# Patient Record
Sex: FEMALE | Race: BLACK OR AFRICAN AMERICAN | NOT HISPANIC OR LATINO
[De-identification: names, ages, dates, MRNs, and addresses within clinical notes are randomized per-mention and may not be internally consistent; named-entity substitution may affect disease eponyms.]

---

## 2017-03-24 ENCOUNTER — TRANSCRIPTION ENCOUNTER (OUTPATIENT)
Age: 57
End: 2017-03-24

## 2017-04-19 ENCOUNTER — TRANSCRIPTION ENCOUNTER (OUTPATIENT)
Age: 57
End: 2017-04-19

## 2017-07-13 ENCOUNTER — TRANSCRIPTION ENCOUNTER (OUTPATIENT)
Age: 57
End: 2017-07-13

## 2019-05-09 PROBLEM — Z00.00 ENCOUNTER FOR PREVENTIVE HEALTH EXAMINATION: Status: ACTIVE | Noted: 2019-05-09

## 2019-08-07 ENCOUNTER — APPOINTMENT (OUTPATIENT)
Dept: BREAST CENTER | Facility: CLINIC | Age: 59
End: 2019-08-07
Payer: COMMERCIAL

## 2019-08-07 VITALS
BODY MASS INDEX: 30.53 KG/M2 | HEART RATE: 79 BPM | DIASTOLIC BLOOD PRESSURE: 94 MMHG | HEIGHT: 66 IN | SYSTOLIC BLOOD PRESSURE: 153 MMHG | WEIGHT: 190 LBS

## 2019-08-07 DIAGNOSIS — Z78.9 OTHER SPECIFIED HEALTH STATUS: ICD-10-CM

## 2019-08-07 DIAGNOSIS — Z80.51 FAMILY HISTORY OF MALIGNANT NEOPLASM OF KIDNEY: ICD-10-CM

## 2019-08-07 DIAGNOSIS — Z83.3 FAMILY HISTORY OF DIABETES MELLITUS: ICD-10-CM

## 2019-08-07 DIAGNOSIS — Z86.79 PERSONAL HISTORY OF OTHER DISEASES OF THE CIRCULATORY SYSTEM: ICD-10-CM

## 2019-08-07 DIAGNOSIS — R92.8 OTHER ABNORMAL AND INCONCLUSIVE FINDINGS ON DIAGNOSTIC IMAGING OF BREAST: ICD-10-CM

## 2019-08-07 PROCEDURE — 99205 OFFICE O/P NEW HI 60 MIN: CPT

## 2019-08-07 RX ORDER — LOSARTAN POTASSIUM AND HYDROCHLOROTHIAZIDE 12.5; 1 MG/1; MG/1
100-12.5 TABLET ORAL DAILY
Refills: 0 | Status: ACTIVE | COMMUNITY

## 2019-08-07 NOTE — PROCEDURE
[FreeTextEntry1] : Right breast ultrasound [FreeTextEntry2] : Assess cyst [FreeTextEntry3] : The right retroareolar region was imaged and a discrete cyst was not visualized

## 2019-08-07 NOTE — PHYSICAL EXAM
[EOMI] : extra ocular movement intact [Sclera nonicteric] : sclera nonicteric [Supple] : supple [No Supraclavicular Adenopathy] : no supraclavicular adenopathy [No Cervical Adenopathy] : no cervical adenopathy [No Thyromegaly] : no thyromegaly [Clear to Auscultation Bilat] : clear to auscultation bilaterally [Normal Sinus Rhythm] : normal sinus rhythm [Normal S1, S2] : normal S1 and S2 [No dominant masses] : no dominant masses in right breast  [Examined in the supine and seated position] : examined in the supine and seated position [No dominant masses] : no dominant masses left breast [No Nipple Retraction] : no right nipple retraction [No Nipple Discharge] : no left nipple discharge [Soft] : abdomen soft [No Axillary Lymphadenopathy] : no left axillary lymphadenopathy [Not Tender] : non-tender [No Palpable Masses] : no abdominal mass palpated

## 2019-08-07 NOTE — HISTORY OF PRESENT ILLNESS
[FreeTextEntry1] : This is a 59 year old female who had some pain in her left breast and was sent for a mammogram in January.  A new nodule was seen on the right and an ultrasound was done.  The left pain went away, but Dr. Scanlon advised she come in for an evaluation.  She has no current breast symptoms.

## 2019-08-07 NOTE — DATA REVIEWED
[FreeTextEntry1] : Bilateral mammogram 1/26/2019:  1 cm Nodule in right retroareolar area.  Sonogram advised. (Nodule has some calcifications)\par \par Bilateral breast ultrasound 2/8/2019:  Right 1 cm simple cyst at area of mammographic concern.  Smaller cyst 4 mm 7:00.  Left two cysts 12:00 and 6:00 4 and 7 mm.\par \par (Images reviewed and will be sent to [facility])

## 2019-08-19 ENCOUNTER — FORM ENCOUNTER (OUTPATIENT)
Age: 59
End: 2019-08-19

## 2019-08-20 ENCOUNTER — APPOINTMENT (OUTPATIENT)
Dept: MAMMOGRAPHY | Facility: CLINIC | Age: 59
End: 2019-08-20
Payer: COMMERCIAL

## 2019-08-20 ENCOUNTER — APPOINTMENT (OUTPATIENT)
Dept: ULTRASOUND IMAGING | Facility: CLINIC | Age: 59
End: 2019-08-20
Payer: COMMERCIAL

## 2019-08-20 ENCOUNTER — OUTPATIENT (OUTPATIENT)
Dept: OUTPATIENT SERVICES | Facility: HOSPITAL | Age: 59
LOS: 1 days | End: 2019-08-20
Payer: COMMERCIAL

## 2019-08-20 DIAGNOSIS — R92.8 OTHER ABNORMAL AND INCONCLUSIVE FINDINGS ON DIAGNOSTIC IMAGING OF BREAST: ICD-10-CM

## 2019-08-20 PROCEDURE — 77065 DX MAMMO INCL CAD UNI: CPT | Mod: 26,RT

## 2019-08-20 PROCEDURE — G0279: CPT | Mod: 26

## 2019-08-20 PROCEDURE — 77065 DX MAMMO INCL CAD UNI: CPT

## 2019-08-20 PROCEDURE — 76641 ULTRASOUND BREAST COMPLETE: CPT

## 2019-08-20 PROCEDURE — 76641 ULTRASOUND BREAST COMPLETE: CPT | Mod: 26,RT

## 2019-08-20 PROCEDURE — G0279: CPT

## 2020-03-11 ENCOUNTER — APPOINTMENT (OUTPATIENT)
Dept: MAMMOGRAPHY | Facility: CLINIC | Age: 60
End: 2020-03-11

## 2020-05-11 ENCOUNTER — APPOINTMENT (OUTPATIENT)
Dept: ULTRASOUND IMAGING | Facility: CLINIC | Age: 60
End: 2020-05-11

## 2020-05-11 ENCOUNTER — APPOINTMENT (OUTPATIENT)
Dept: MAMMOGRAPHY | Facility: CLINIC | Age: 60
End: 2020-05-11

## 2021-07-01 ENCOUNTER — APPOINTMENT (OUTPATIENT)
Dept: ORTHOPEDIC SURGERY | Facility: CLINIC | Age: 61
End: 2021-07-01
Payer: COMMERCIAL

## 2021-07-01 ENCOUNTER — NON-APPOINTMENT (OUTPATIENT)
Age: 61
End: 2021-07-01

## 2021-07-01 PROCEDURE — 99204 OFFICE O/P NEW MOD 45 MIN: CPT

## 2021-07-01 PROCEDURE — 72100 X-RAY EXAM L-S SPINE 2/3 VWS: CPT

## 2021-07-01 RX ORDER — METHYLPREDNISOLONE 4 MG/1
4 TABLET ORAL
Qty: 1 | Refills: 0 | Status: ACTIVE | COMMUNITY
Start: 2021-07-01 | End: 1900-01-01

## 2021-07-01 NOTE — PHYSICAL EXAM
[de-identified] : Lumbosacral Physical Examination: \par \par General: Alert and oriented x3.  In no acute distress.  Pleasant in nature with a normal affect.  No apparent respiratory distress. \par Inspection: No swelling, No scoliosis present.\par \par ROM:\par Forward Flexion: 90 degrees\par Extension: 30 degrees\par Lateral Flexion to Left: 30 degrees\par Lateral Flexion to Right: 30 degrees\par Rotation to Left: 40 degrees\par Rotation to Right: 40 degrees\par \par Normal Hip ROM.\par \par Palpation: \par Thoracolumbar Paraspinal Muscles: Negative\par Costovertebral Angle Pain/Spine Percussion: Negative for pain over the Kidney's with Spine Percussion.\par \par Special Tests:\par Straight Leg Raise: Negative\par \par Neurovascularly Intact and Motor with No Foot Drop present on examination today.  The patient does have sensory changes in the lateral thigh region.  She does not have numbness. [de-identified] : 2 views x-rays of the lumbosacral spine reviewed, 7/1/2021: narrowing between L4-L5, L5- S1. Spondylitises between L4-L5.

## 2021-07-01 NOTE — ADDENDUM
[FreeTextEntry1] : I, Herbert Pressley, acted solely as a scribe for Dr. Christiano Lemon on this date 07/01/2021  .\par  \par All medical record entries made by the Scribe were at my, Dr. Christiano Lemon, direction and personally dictated by me on 07/01/2021 . I have reviewed the chart and agree that the record accurately reflects my personal performance of the history, physical exam, assessment and plan. I have also personally directed, reviewed, and agreed with the chart.

## 2021-07-01 NOTE — DISCUSSION/SUMMARY
[de-identified] : Today I had a lengthy discussion with the patient regarding their left leg, radiculopathy pain. I have addressed all the patient's concerns surrounding the pathology of their condition. XR films were reviewed with the patient. \par \par At this time I would like to obtain advanced imaging of the patient's lumbar spine. An MRI was ordered so I can find out more about the etiology of the patient's condition. The patient should follow up with the office after obtaining the MRI. I also recommended the patient to obtain an EMG study to evaluate for neurological etiology. I recommend that the patient utilize a methylprednisolone steroid taper pack. The prescription was provided in the office today. \par \par The patient understood and verbally agreed to the treatment plan. All of their questions were answered and they were satisfied with the visit. The patient should call the office if they have any questions or experience worsening symptoms.

## 2021-07-01 NOTE — HISTORY OF PRESENT ILLNESS
[de-identified] : The patient is a 61-year-old female who is presenting with chronic left leg pain that radiates up and down her leg from her hip all the way down to her ankle.  She does have sensation changes in the lateral aspect of her thigh.  She states that the pain gets so bad at night that it causes her to lose sleep.  She has seen a doctor prior to seeing us who prescribed her meloxicam which did not help with her symptoms.  She has no lower back pain at this time.  Her pain scale is controlled today in the office, 2 out of 10 but she does have pain in the leg.  She has no weakness or bowel bladder incontinence noted.  There was no trauma associated to this.  No other complaints.

## 2021-07-29 ENCOUNTER — OUTPATIENT (OUTPATIENT)
Dept: OUTPATIENT SERVICES | Facility: HOSPITAL | Age: 61
LOS: 1 days | End: 2021-07-29

## 2021-07-29 ENCOUNTER — APPOINTMENT (OUTPATIENT)
Dept: MRI IMAGING | Facility: CLINIC | Age: 61
End: 2021-07-29
Payer: COMMERCIAL

## 2021-07-29 PROCEDURE — 72148 MRI LUMBAR SPINE W/O DYE: CPT | Mod: 26

## 2021-08-06 ENCOUNTER — APPOINTMENT (OUTPATIENT)
Dept: PHYSICAL MEDICINE AND REHAB | Facility: CLINIC | Age: 61
End: 2021-08-06
Payer: COMMERCIAL

## 2021-08-06 VITALS
HEIGHT: 66 IN | SYSTOLIC BLOOD PRESSURE: 186 MMHG | BODY MASS INDEX: 30.53 KG/M2 | HEART RATE: 94 BPM | RESPIRATION RATE: 14 BRPM | WEIGHT: 190 LBS | DIASTOLIC BLOOD PRESSURE: 106 MMHG

## 2021-08-06 PROCEDURE — 95908 NRV CNDJ TST 3-4 STUDIES: CPT

## 2021-08-06 PROCEDURE — 95886 MUSC TEST DONE W/N TEST COMP: CPT | Mod: LT

## 2021-08-18 ENCOUNTER — APPOINTMENT (OUTPATIENT)
Dept: ORTHOPEDIC SURGERY | Facility: CLINIC | Age: 61
End: 2021-08-18
Payer: COMMERCIAL

## 2021-08-18 PROCEDURE — 99213 OFFICE O/P EST LOW 20 MIN: CPT

## 2021-08-18 RX ORDER — LIDOCAINE 5% 700 MG/1
5 PATCH TOPICAL
Qty: 30 | Refills: 2 | Status: ACTIVE | COMMUNITY
Start: 2021-08-18 | End: 1900-01-01

## 2021-08-18 NOTE — DISCUSSION/SUMMARY
[de-identified] : At this time I reviewed the MRI in great details with her. I do believe the symptoms that she experiences in the leg is coming from the lower back region. I do want her to see a physiatrist in the near future to evaluate her lower back and the MRI. In the interim for her pain I prescribed her a prescription strength One-A-Day anti-inflammatory as well as Lidoderm patches. I gave her a physical therapy prescription as well which she will start as soon as possible. All of her questions were answered. She understood the pathology and treatment course at this time. She can follow-up in his office on an as-needed basis.

## 2021-08-18 NOTE — HISTORY OF PRESENT ILLNESS
[de-identified] : 8/18/21: The patient presents in office today to review the MRI of her lower back. She continues to have pain shooting from her lower back down to her left thigh region of her left leg. She denies bowel or bladder incontinence. She has no weakness in the left lower extremity. She does have pain in the back today, 4 out of 10 pain. She is walking with a slight limp due to her back pain today. She has no other complaints.\par \par 7/1/21: The patient is a 61-year-old female who is presenting with chronic left leg pain that radiates up and down her leg from her hip all the way down to her ankle.  She does have sensation changes in the lateral aspect of her thigh.  She states that the pain gets so bad at night that it causes her to lose sleep.  She has seen a doctor prior to seeing us who prescribed her meloxicam which did not help with her symptoms.  She has no lower back pain at this time.  Her pain scale is controlled today in the office, 2 out of 10 but she does have pain in the leg.  She has no weakness or bowel bladder incontinence noted.  There was no trauma associated to this.  No other complaints.

## 2021-12-20 ENCOUNTER — APPOINTMENT (OUTPATIENT)
Dept: ORTHOPEDIC SURGERY | Facility: CLINIC | Age: 61
End: 2021-12-20
Payer: COMMERCIAL

## 2021-12-20 DIAGNOSIS — M47.816 SPONDYLOSIS W/OUT MYELOPATHY OR RADICULOPATHY, LUMBAR REGION: ICD-10-CM

## 2021-12-20 DIAGNOSIS — M79.605 PAIN IN LEFT LEG: ICD-10-CM

## 2021-12-20 DIAGNOSIS — M54.10 RADICULOPATHY, SITE UNSPECIFIED: ICD-10-CM

## 2021-12-20 DIAGNOSIS — M43.16 SPONDYLOLISTHESIS, LUMBAR REGION: ICD-10-CM

## 2021-12-20 DIAGNOSIS — M47.818 SPONDYLOSIS W/OUT MYELOPATHY OR RADICULOPATHY, SACRAL AND SACROCOCCYGEAL REGION: ICD-10-CM

## 2021-12-20 DIAGNOSIS — M48.061 SPINAL STENOSIS, LUMBAR REGION WITHOUT NEUROGENIC CLAUDICATION: ICD-10-CM

## 2021-12-20 PROCEDURE — 99213 OFFICE O/P EST LOW 20 MIN: CPT

## 2021-12-20 RX ORDER — MELOXICAM 15 MG/1
15 TABLET ORAL DAILY
Qty: 30 | Refills: 1 | Status: ACTIVE | COMMUNITY
Start: 2021-08-18 | End: 1900-01-01

## 2021-12-20 NOTE — REASON FOR VISIT
[Follow-Up Visit] : a follow-up visit for [Initial Visit] : an initial visit for [FreeTextEntry2] : Chronic left leg pain, radiating.

## 2021-12-20 NOTE — PHYSICAL EXAM
[de-identified] : Lumbosacral Physical Examination: \par \par General: Alert and oriented x3.  In no acute distress.  Pleasant in nature with a normal affect.  No apparent respiratory distress. \par Inspection: No swelling, No scoliosis present.\par \par ROM:\par Forward Flexion: 80 degrees\par Extension: 30 degrees\par Lateral Flexion to Left: 30 degrees\par Lateral Flexion to Right: 30 degrees\par Rotation to Left: 40 degrees\par Rotation to Right: 40 degrees\par \par Normal Hip ROM.\par \par Palpation: \par Thoracolumbar Paraspinal Muscles: Negative\par Costovertebral Angle Pain/Spine Percussion: Negative for pain over the Kidney's with Spine Percussion.\par \par Special Tests:\par Straight Leg Raise: Negative\par \par Neurovascularly Intact and Motor with No Foot Drop present on examination today.  The patient does have sensory changes in the lateral thigh region.  She does not have numbness. [de-identified] : EXAM: MR SPINE LUMBAR\par \par \par PROCEDURE DATE: 07/29/2021\par \par \par \par INTERPRETATION: EXAMINATION: MR LUMBAR SPINE\par \par CLINICAL INDICATION: Evaluate for disc herniation and left-sided nerve compression.\par \par TECHNIQUE: Multi-planar, multi-sequence MR of the lumbar spine was performed without intravenous contrast.\par \par COMPARISON: None.\par \par INTERPRETATION:\par \par BONES AND BONE MARROW: There is severe multilevel facet arthropathy detailed below. There is a T1/T2 hyperintense lesion within the left sacrum consistent with hemangioma. There is an L2 hemangioma. Scattered additional foci of fat versus hemangiomas are also present.\par INTERVERTEBRAL DISCS: There is disc desiccation and loss of disc height predominant at L5-S1.\par ALIGNMENT: There is mild anterolisthesis of L4 on L5.\par CONUS AND CAUDA EQUINA: The conus medullaris is normal in size, signal and location. The conus terminates at the L1 level.\par EXTRASPINAL: There is degenerative arthrosis with prominent T1 hypointense subchondral signal abnormality at the sacroiliac joints likely representing sclerosis. There is thickening of bilateral adrenal glands without a focal nodule. There are several left-sided renal cysts.\par \par Evaluation of individual lumbar disc space levels demonstrates the following:\par \par L1/L2, There is no significant spinal canal or neural foraminal stenosis.\par L2/L3, mild posterior disc bulging with moderate facet arthropathy are present. There is no significant central canal or neural foraminal stenosis.\par L3/L4, mild posterior disc bulging with severe hypertrophic facet arthropathy are present. There is no significant central canal or neural foraminal stenosis.\par L4/L5, mild uncovering of the posterior disc with severe hypertrophic facet arthropathy are present. There is no significant central canal or neural foraminal stenosis.\par L5/S1, mild posterior disc bulging with severe hypertrophic facet arthropathy are present. There is mild bilateral neural foraminal stenosis. There is no significant central stenosis.\par \par IMPRESSION:\par Lumbar spondylosis with mild anterolisthesis at L4-5, severe facet arthropathy at L3-4, L4-5 and L5-S1, and mild multilevel posterior disc bulging. Mild bilateral foraminal stenosis is present at L5-S1. No significant central canal stenosis.\par \par Sacroiliac joint degenerative arthritis.\par \par --- End of Report ---\par \par \par \par \par \par \par SHLOMIT GOLDBERG-STEIN MD; Attending Radiologist\par This document has been electronically signed. Aug 2 2021 10:49PM

## 2021-12-20 NOTE — HISTORY OF PRESENT ILLNESS
[de-identified] : 12/20/21: The patient is here for a follow-up and the pain down her legs has improved.  She is in PT.  She is having some hip pain left side.  No locking or catching of the hip.  \par \par 8/18/21: The patient presents in office today to review the MRI of her lower back. She continues to have pain shooting from her lower back down to her left thigh region of her left leg. She denies bowel or bladder incontinence. She has no weakness in the left lower extremity. She does have pain in the back today, 4 out of 10 pain. She is walking with a slight limp due to her back pain today. She has no other complaints.\par \par 7/1/21: The patient is a 61-year-old female who is presenting with chronic left leg pain that radiates up and down her leg from her hip all the way down to her ankle.  She does have sensation changes in the lateral aspect of her thigh.  She states that the pain gets so bad at night that it causes her to lose sleep.  She has seen a doctor prior to seeing us who prescribed her meloxicam which did not help with her symptoms.  She has no lower back pain at this time.  Her pain scale is controlled today in the office, 2 out of 10 but she does have pain in the leg.  She has no weakness or bowel bladder incontinence noted.  There was no trauma associated to this.  No other complaints.

## 2021-12-20 NOTE — DISCUSSION/SUMMARY
[de-identified] : At this time I reviewed the MRI in great details with her. I do believe the symptoms that she experiences in the leg is coming from the lower back region. I do want her to see a physiatrist in the near future to evaluate her lower back and the MRI. In the interim for her pain I prescribed her a prescription strength One-A-Day anti-inflammatory as well as Lidoderm patches. I gave her a physical therapy prescription as well which she will start as soon as possible. All of her questions were answered. She understood the pathology and treatment course at this time. She can follow-up in his office on an as-needed basis.

## 2022-02-19 ENCOUNTER — TRANSCRIPTION ENCOUNTER (OUTPATIENT)
Age: 62
End: 2022-02-19

## 2022-08-09 NOTE — REVIEW OF SYSTEMS
[Joint Pain] : joint pain [Negative] : Heme/Lymph [FreeTextEntry9] : Left leg pain Notification Instructions: Patient will be notified of biopsy results. However, patient instructed to call the office if not contacted within 2 weeks.

## 2022-12-09 ENCOUNTER — NON-APPOINTMENT (OUTPATIENT)
Age: 62
End: 2022-12-09

## 2023-08-08 ENCOUNTER — APPOINTMENT (OUTPATIENT)
Dept: ORTHOPEDIC SURGERY | Facility: CLINIC | Age: 63
End: 2023-08-08
Payer: COMMERCIAL

## 2023-08-08 VITALS — WEIGHT: 200 LBS | BODY MASS INDEX: 32.14 KG/M2 | HEIGHT: 66 IN

## 2023-08-08 PROCEDURE — 99213 OFFICE O/P EST LOW 20 MIN: CPT

## 2023-08-08 NOTE — HISTORY OF PRESENT ILLNESS
[Tingling] : tingling [Retired] : Work status: retired [de-identified] : 63 year old female with 4-6 weeks LEFT hand numbness and pain with tingling.  Her symptoms interrupt sleep.  Denies trauma.  Symptoms are worse in the morning.  There are times of the day where the fingers feel normal.  Has had no intervention.   RHD [] : Post Surgical Visit: no [FreeTextEntry1] : L hand [FreeTextEntry3] : 7/8/23 [FreeTextEntry5] : Pt is a 62 y/o RHD F c/o 1 mo of atraumatic numbness and tingling. No pror TX  [FreeTextEntry6] : Numbness  [de-identified] : None [de-identified] : [facility]

## 2023-08-08 NOTE — DISCUSSION/SUMMARY
[de-identified] : Discussed the nature of the diagnosis and risk and benefits of different modalities of treatment. Provide with prescription for cock up wrist splint.  Questions answered.

## 2023-08-08 NOTE — PHYSICAL EXAM
[Left] : left hand [] : thenar atrophy [FreeTextEntry3] : L hand: Trace Thenar atrophy Tender volar wrist  Good finger ROM  +Tinels  +Phalens  +Compression test

## 2023-09-12 ENCOUNTER — APPOINTMENT (OUTPATIENT)
Dept: ORTHOPEDIC SURGERY | Facility: CLINIC | Age: 63
End: 2023-09-12
Payer: COMMERCIAL

## 2023-09-12 VITALS — BODY MASS INDEX: 32.14 KG/M2 | HEIGHT: 66 IN | WEIGHT: 200 LBS

## 2023-09-12 PROCEDURE — 99213 OFFICE O/P EST LOW 20 MIN: CPT

## 2023-10-18 NOTE — PHYSICAL EXAM
[de-identified] : Lumbosacral Physical Examination: \par \par General: Alert and oriented x3.  In no acute distress.  Pleasant in nature with a normal affect.  No apparent respiratory distress. \par Inspection: No swelling, No scoliosis present.\par \par ROM:\par Forward Flexion: 70 degrees\par Extension: 30 degrees\par Lateral Flexion to Left: 30 degrees\par Lateral Flexion to Right: 30 degrees\par Rotation to Left: 40 degrees\par Rotation to Right: 40 degrees\par \par Normal Hip ROM.\par \par Palpation: \par Thoracolumbar Paraspinal Muscles: Negative\par Costovertebral Angle Pain/Spine Percussion: Negative for pain over the Kidney's with Spine Percussion.\par \par Special Tests:\par Straight Leg Raise: Negative\par \par Neurovascularly Intact and Motor with No Foot Drop present on examination today.  The patient does have sensory changes in the lateral thigh region.  She does not have numbness. [de-identified] : EXAM: MR SPINE LUMBAR\par \par \par PROCEDURE DATE: 07/29/2021\par \par \par \par INTERPRETATION: EXAMINATION: MR LUMBAR SPINE\par \par CLINICAL INDICATION: Evaluate for disc herniation and left-sided nerve compression.\par \par TECHNIQUE: Multi-planar, multi-sequence MR of the lumbar spine was performed without intravenous contrast.\par \par COMPARISON: None.\par \par INTERPRETATION:\par \par BONES AND BONE MARROW: There is severe multilevel facet arthropathy detailed below. There is a T1/T2 hyperintense lesion within the left sacrum consistent with hemangioma. There is an L2 hemangioma. Scattered additional foci of fat versus hemangiomas are also present.\par INTERVERTEBRAL DISCS: There is disc desiccation and loss of disc height predominant at L5-S1.\par ALIGNMENT: There is mild anterolisthesis of L4 on L5.\par CONUS AND CAUDA EQUINA: The conus medullaris is normal in size, signal and location. The conus terminates at the L1 level.\par EXTRASPINAL: There is degenerative arthrosis with prominent T1 hypointense subchondral signal abnormality at the sacroiliac joints likely representing sclerosis. There is thickening of bilateral adrenal glands without a focal nodule. There are several left-sided renal cysts.\par \par Evaluation of individual lumbar disc space levels demonstrates the following:\par \par L1/L2, There is no significant spinal canal or neural foraminal stenosis.\par L2/L3, mild posterior disc bulging with moderate facet arthropathy are present. There is no significant central canal or neural foraminal stenosis.\par L3/L4, mild posterior disc bulging with severe hypertrophic facet arthropathy are present. There is no significant central canal or neural foraminal stenosis.\par L4/L5, mild uncovering of the posterior disc with severe hypertrophic facet arthropathy are present. There is no significant central canal or neural foraminal stenosis.\par L5/S1, mild posterior disc bulging with severe hypertrophic facet arthropathy are present. There is mild bilateral neural foraminal stenosis. There is no significant central stenosis.\par \par IMPRESSION:\par Lumbar spondylosis with mild anterolisthesis at L4-5, severe facet arthropathy at L3-4, L4-5 and L5-S1, and mild multilevel posterior disc bulging. Mild bilateral foraminal stenosis is present at L5-S1. No significant central canal stenosis.\par \par Sacroiliac joint degenerative arthritis.\par \par --- End of Report ---\par \par \par \par \par \par \par SHLOMIT GOLDBERG-STEIN MD; Attending Radiologist\par This document has been electronically signed. Aug 2 2021 10:49PM N: regular w/ Ensure  E: Mg <2, Phosp <3, K <4  DVT ppx: Lovenox  GI: Protonix 40mg PO qd  C: Full Code  D: Telemetry

## 2023-10-24 ENCOUNTER — APPOINTMENT (OUTPATIENT)
Dept: ORTHOPEDIC SURGERY | Facility: CLINIC | Age: 63
End: 2023-10-24
Payer: COMMERCIAL

## 2023-10-24 DIAGNOSIS — M18.12 UNILATERAL PRIMARY OSTEOARTHRITIS OF FIRST CARPOMETACARPAL JOINT, LEFT HAND: ICD-10-CM

## 2023-10-24 DIAGNOSIS — G56.02 CARPAL TUNNEL SYNDROME, LEFT UPPER LIMB: ICD-10-CM

## 2023-10-24 PROCEDURE — 99213 OFFICE O/P EST LOW 20 MIN: CPT
